# Patient Record
Sex: MALE | Race: WHITE | NOT HISPANIC OR LATINO | Employment: FULL TIME | URBAN - METROPOLITAN AREA
[De-identification: names, ages, dates, MRNs, and addresses within clinical notes are randomized per-mention and may not be internally consistent; named-entity substitution may affect disease eponyms.]

---

## 2022-01-26 ENCOUNTER — OFFICE VISIT (OUTPATIENT)
Dept: DERMATOLOGY | Facility: CLINIC | Age: 22
End: 2022-01-26
Payer: COMMERCIAL

## 2022-01-26 DIAGNOSIS — L70.0 ACNE VULGARIS: Primary | ICD-10-CM

## 2022-01-26 PROCEDURE — 99204 OFFICE O/P NEW MOD 45 MIN: CPT | Performed by: DERMATOLOGY

## 2022-01-26 RX ORDER — SULFAMETHOXAZOLE/TRIMETHOPRIM 800-160 MG
TABLET ORAL
Qty: 180 TABLET | Refills: 0 | Status: SHIPPED | OUTPATIENT
Start: 2022-01-26 | End: 2022-06-06

## 2022-01-26 RX ORDER — TAZAROTENE 1 MG/G
CREAM TOPICAL
Qty: 60 G | Refills: 11 | Status: SHIPPED | OUTPATIENT
Start: 2022-01-26 | End: 2022-06-06

## 2022-01-26 RX ORDER — SULFAMETHOXAZOLE/TRIMETHOPRIM 800-160 MG
TABLET ORAL
COMMUNITY
Start: 2021-12-29 | End: 2022-01-26

## 2022-01-26 RX ORDER — CLASCOTERONE 1 G/100G
CREAM TOPICAL
COMMUNITY
Start: 2021-12-01

## 2022-01-26 RX ORDER — TAZAROTENE 1 MG/G
CREAM TOPICAL
COMMUNITY
End: 2022-01-26

## 2022-01-26 NOTE — Clinical Note
Send photos and phone in 8 weeks. Thursday photos and phone in the afternoon, anytime in the afternoon. You can just send an appt in [employer], no call needed

## 2022-01-26 NOTE — PATIENT INSTRUCTIONS
[facility] Dermatology Visit    Thank you for allowing us to participate in your care. Your findings, instructions and follow-up plan are as follows:         When should I call my doctor?    If you are worsening or not improving, please, contact us or seek urgent care as noted below.     Who should I call with questions (adults)?    [facility] (adult and pediatric): [phone]    [facility] (adult): [phone]    For urgent needs outside of business hours call the [facility] at [phone] and ask for the dermatology resident on call    If this is a medical emergency and you are unable to reach an ER, Call 911    Who should I call with questions (pediatric)?  [facility]- Pediatric Dermatology  Dr. Magaly Metz, Dr. Emilie Vinson, Dr. Ivelisse Martinez, Zeinab Cardneas, [place]  Dr. Sera Morales, Dr. Merry Horowitz & Dr. Neftaly Whatley  Non Urgent  Nurse Triage Line; [phone]- Martine and Mahogany GUADARRAMA Care Coordinators   Joi (/Complex ) [phone]    If you need a prescription refill, please contact your pharmacy. Refills are approved or denied by our physicians during normal business hours, Monday through Fridays  Per office policy, refills will not be granted if you have not been seen within the past year (or sooner depending on your child's condition).    Scheduling Information:  Pediatric Appointment Scheduling and Call Center [phone]  Radiology Scheduling- [phone]  Sedation Unit Scheduling- [phone]  [place] Scheduling- General [phone]; Pediatric Dermatology [phone]  Main  Services: [phone]  Greek: [phone]  Bahraini: [phone]  Hmong/English/Slovenian: [phone]  Preadmission Nursing Department Fax Number: [phone] (fax all pre-operative paperwork to this number)    For urgent matters arising during evenings,  weekends, or holidays that cannot wait for normal business hours please call [phone] and ask for the dermatology resident on call to be paged.                       Clascoterone: Patient drug information  Access [employer] for additional drug information, tools, and databases.  Copyright 3177-9560 [employer] All rights reserved.       Stop and hold this drug if irritating to skin.        Brand Names: US  Winlevi     What is this drug used for?    It is used to treat pimples (acne).      What do I need to tell my doctor BEFORE I take this drug?    If you are allergic to this drug; any part of this drug; or any other drugs, foods, or substances. Tell your doctor about the allergy and what signs you had.  This drug may interact with other drugs or health problems.  Tell your doctor and pharmacist about all of your drugs (prescription or OTC, natural products, vitamins) and health problems. You must check to make sure that it is safe for you to take this drug with all of your drugs and health problems. Do not start, stop, or change the dose of any drug without checking with your doctor.      What are some things I need to know or do while I take this drug?    Tell all of your health care providers that you take this drug. This includes your doctors, nurses, pharmacists, and dentists.    Use care when using on a large part of the skin. Talk with the doctor.    Use of other skin products while using this drug may cause more irritation.    Talk with your doctor before you use other drugs or products on your skin.    If the patient is a child, use this drug with care. The risk of some side effects may be higher in children.    Tell your doctor if you are pregnant, plan on getting pregnant, or are breast-feeding. You will need to talk about the benefits and risks to you and the baby.      What are some side effects that I need to call my doctor about right away?  WARNING/CAUTION: Even though it may be rare,  some people may have very bad and sometimes deadly side effects when taking a drug. Tell your doctor or get medical help right away if you have any of the following signs or symptoms that may be related to a very bad side effect:    Signs of an allergic reaction, like rash; hives; itching; red, swollen, blistered, or peeling skin with or without fever; wheezing; tightness in the chest or throat; trouble breathing, swallowing, or talking; unusual hoarseness; or swelling of the mouth, face, lips, tongue, or throat.    Signs of a weak adrenal gland like a very bad upset stomach or throwing up, very bad dizziness or passing out, muscle weakness, feeling very tired, mood changes, not hungry, or weight loss.      What are some other side effects of this drug?  All drugs may cause side effects. However, many people have no side effects or only have minor side effects. Call your doctor or get medical help if any of these side effects or any other side effects bother you or do not go away:    Dryness, itching, redness, scaling, or other irritation where this drug was used.  These are not all of the side effects that may occur. If you have questions about side effects, call your doctor. Call your doctor for medical advice about side effects.  You may report side effects to your national health agency.      How is this drug best taken?  Use this drug as ordered by your doctor. Read all information given to you. Follow all instructions closely.    Do not take this drug by mouth. Use on your skin only. Keep out of your mouth, nose, and eyes (may burn).    Do not put in the vagina.    If you get this drug in any of these areas, rinse well with water.    Clean affected part before use. Make sure to dry well.    Put a thin layer on the affected part.    Wash your hands after use.    Do not put on cuts, scrapes, eczema, or damaged skin.    Do not put on sunburned skin.    Do not use coverings (bandages, dressings) unless told to do  so by the doctor.      What do I do if I miss a dose?    Put on a missed dose as soon as you think about it.    If it is close to the time for your next dose, skip the missed dose and go back to your normal time.    Do not put on 2 doses at the same time or extra doses.  How do I store and/or throw out this drug?    Store unopened containers in a refrigerator. Do not freeze.    After opening, store at room temperature.    Throw away any part not used 180 days after you get it from the pharmacy or 1 month after first use, whichever comes first.    Keep all drugs in a safe place. Keep all drugs out of the reach of children and pets.    Throw away unused or  drugs. Do not flush down a toilet or pour down a drain unless you are told to do so. Check with your pharmacist if you have questions about the best way to throw out drugs. There may be drug take-back programs in your area.    General drug facts    If your symptoms or health problems do not get better or if they become worse, call your doctor.    Do not share your drugs with others and do not take anyone else's drugs.    Some drugs may have another patient information leaflet. If you have any questions about this drug, please talk with your doctor, nurse, pharmacist, or other health care provider.    If you think there has been an overdose, call your poison control center or get medical care right away. Be ready to tell or show what was taken, how much, and when it happened.  Last Reviewed Date  2020

## 2022-01-26 NOTE — LETTER
Date:January 27, 2022      Patient was self referred, no letter generated. Do not send.        [facility] Health Information

## 2022-01-26 NOTE — PROGRESS NOTES
[facility] Dermatology Note  Encounter Date: Jan 26, 2022  Office Visit     Dermatology Problem List:  # Acne vulgaris  - Current rx: Neutrogena wash, Winlevi 1% cream BID, Bactrim BID, and tazarotene 0.1% cream  - Previous Rx: Tazorac, Acitretin, azithromycin, Onexton, seysara (2019), Solodyn (2013), Epiduo (2011), Differin (2011), Augmentin (2014), soriatane (2016), Cipro, alpha hydroxy acid 40% and 50% chemical peel monthly (2017 - 2021)  - Previous lasers: Sciton (red), Erbium (scars), Profound (scars)  - ILK   - At home UV light  # Family history of cystic acne and Crohn's  # Social hx-   ____________________________________________    Assessment & Plan:    # Acne vulgaris- appears to be severe with severe scarring, does not want accutane due to the risk of inflammatory bowel disease. Wants peels but does not want out of pocket cost. Saw Dr. Garcia, I agree that co2 should not be performed unless acne is clear but we could consider PDL so I will review his record.  He appears improved on bactrim, we will try to drop the dose the next few weeks    - Discussed that chemical peels are salicylic acid based and not covered by insurance.    - Also discussed various laser treatments and advised that PDL could be used on PDL. Advised that this is not covered by insurance unless the scars are keloidal or hypertrophic.   - Patient does not feel that the side effects of Accutane outweigh the benefits.   - Continue Winlevi 1% cream BID. No more than 60 grams in 1 month. HPA suppression reviewed  - Continue Tazarotene 0.1% cream once daily. Refills provided. Not irritating.   - Continue Bactrim 1-2x daily. Recommended tapering down to once daily. Reviewed risk of idiosyncratic reaction and skin records  - Quote for PDL 2 areas. However, need records from Dr. Garcia to see opinion prior to performing.       # acne scarring as above    # Scars, forearm- possibly excoriations but patient  reports possibly acne    Procedures Performed:   None    Follow-up: 6-8 week(s) virtually (telephone with photos), or earlier for new or changing lesions    Staff and Scribe:     Scribe Disclosure:  I, Leny Malvin, am serving as a scribe to document services personally performed by Ivonne Corral MD based on data collection and the provider's statements to me.       Provider Disclosure:   The documentation recorded by the scribe accurately reflects the services I personally performed and the decisions made by me.    Ivonne Corral MD    Department of Dermatology  [facility]: Phone: [phone], Fax:[phone]  [facility]: Phone: [phone], Fax: [phone]      ____________________________________________    CC: Derm Problem (Dheeraj is here to establish care for acne treatment. Has had topical and laser treatment and does not want accutane.)    HPI:  Mr. Dheeraj Hernandez is a(n) 21 year old male who presents today as a new patient for acne.    He developed cystic acne at about 9 years old. Patient is from [place] where he followed with Dr. Dmitry Waller for many years. Since moving here in July 2021, he has seen Dr. Garica and Dr. Gamez. He states his acne is cyclic. It tends to worsen in the summer, particularly on his forehead. Stress also worsens his acne. Today, is a good day for his acne.     Many treatments work for a short period of time, then stop working. He was previously on soriatane for about 1.5 years, which caused severe dry lips and skin. He has also been on BP which he did not feel were beneficial. He would like to restart montly chemical peels  Currently, he is using Neutrogena wash, Winlevi 1% cream BID, Bactrim BID (since October by Dr. Gamez, no side effects), and tazarotene 0.1% cream. Bactrim is cheaper than other medications and works decently. No history of  depression or anxiety.     Dr. Waller noticed a lot of bad reactions on Accutane. His mother and aunt have Crohn's disease. He does not typically pick at his acne.       Patient is otherwise feeling well, without additional skin concerns.    Labs Reviewed:  N/A    Physical Exam:  Vitals: There were no vitals taken for this visit.  SKIN: Focused examination of face and trunk was performed.  - Acneiform scarring on the right forearm. Erythematous acneiform scaring on chest and arms. Erythematous, severe and boxscarring  - There are superifical acneiform papules with intermixed open and closed comedones on the forehead and left cheek.   - Nodules on left cheek  - No other lesions of concern on areas examined.     Medications:  Current Outpatient Medications   Medication     Clascoterone (WINLEVI) 1 % CREA     sulfamethoxazole-trimethoprim (BACTRIM DS) 800-160 MG tablet     tazarotene (TAZORAC) 0.1 % external cream     No current facility-administered medications for this visit.      Past Medical History:   There is no problem list on file for this patient.    No past medical history on file.     CC Referred Self, MD  No address on file on close of this encounter.

## 2022-01-26 NOTE — LETTER
1/26/2022       RE: Dheeraj Hernandez  [address]  [address]  [place] [place] [address]     Dear Colleague,    Thank you for referring your patient, Dheeraj Hernandez, to the [facility]MAT[facility] [place] [facility]. Please see a copy of my visit note below.    [facility] Dermatology Note  Encounter Date: Jan 26, 2022  Office Visit     Dermatology Problem List:  # Acne vulgaris  - Current rx: Neutrogena wash, Winlevi 1% cream BID, Bactrim BID, and tazarotene 0.1% cream  - Previous Rx: Tazorac, Acitretin, azithromycin, Onexton, seysara (2019), Solodyn (2013), Epiduo (2011), Differin (2011), Augmentin (2014), soriatane (2016), Cipro, alpha hydroxy acid 40% and 50% chemical peel monthly (2017 - 2021)  - Previous lasers: Sciton (red), Erbium (scars), Profound (scars)  - ILK   - At home UV light  # Family history of cystic acne and Crohn's  # Social hx-   ____________________________________________    Assessment & Plan:    # Acne vulgaris- appears to be severe with severe scarring, does not want accutane due to the risk of inflammatory bowel disease. Wants peels but does not want out of pocket cost. Saw Dr. Garcia, I agree that co2 should not be performed unless acne is clear but we could consider PDL so I will review his record.  He appears improved on bactrim, we will try to drop the dose the next few weeks    - Discussed that chemical peels are salicylic acid based and not covered by insurance.    - Also discussed various laser treatments and advised that PDL could be used on PDL. Advised that this is not covered by insurance unless the scars are keloidal or hypertrophic.   - Patient does not feel that the side effects of Accutane outweigh the benefits.   - Continue Winlevi 1% cream BID. No more than 60 grams in 1 month. HPA suppression reviewed  - Continue Tazarotene 0.1% cream once daily. Refills provided. Not  irritating.   - Continue Bactrim 1-2x daily. Recommended tapering down to once daily. Reviewed risk of idiosyncratic reaction and skin records  - Quote for PDL 2 areas. However, need records from Dr. Garcia to see opinion prior to performing.       # acne scarring as above    # Scars, forearm- possibly excoriations but patient reports possibly acne    Procedures Performed:   None    Follow-up: 6-8 week(s) virtually (telephone with photos), or earlier for new or changing lesions    Staff and Scribe:     Scribe Disclosure:  I, Leny Coombs, am serving as a scribe to document services personally performed by Ivonne Corral MD based on data collection and the provider's statements to me.       Provider Disclosure:   The documentation recorded by the scribe accurately reflects the services I personally performed and the decisions made by me.    Ivonne Corral MD    Department of Dermatology  [facility]: Phone: [phone], Fax:[phone]  [facility]: Phone: [phone], Fax: [phone]      ____________________________________________    CC: Derm Problem (Dheeraj is here to establish care for acne treatment. Has had topical and laser treatment and does not want accutane.)    HPI:  Mr. Dheeraj Hernandez is a(n) 21 year old male who presents today as a new patient for acne.    He developed cystic acne at about 9 years old. Patient is from [place] where he followed with Dr. Dmitry Waller for many years. Since moving here in July 2021, he has seen Dr. Garcia and Dr. Gamez. He states his acne is cyclic. It tends to worsen in the summer, particularly on his forehead. Stress also worsens his acne. Today, is a good day for his acne.     Many treatments work for a short period of time, then stop working. He was previously on soriatane for about 1.5 years, which caused severe dry lips and skin. He  has also been on BP which he did not feel were beneficial. He would like to restart montly chemical peels  Currently, he is using Neutrogena wash, Winlevi 1% cream BID, Bactrim BID (since October by Dr. Gamez, no side effects), and tazarotene 0.1% cream. Bactrim is cheaper than other medications and works decently. No history of depression or anxiety.     Dr. Waller noticed a lot of bad reactions on Accutane. His mother and aunt have Crohn's disease. He does not typically pick at his acne.       Patient is otherwise feeling well, without additional skin concerns.    Labs Reviewed:  N/A    Physical Exam:  Vitals: There were no vitals taken for this visit.  SKIN: Focused examination of face and trunk was performed.  - Acneiform scarring on the right forearm. Erythematous acneiform scaring on chest and arms. Erythematous, severe and boxscarring  - There are superifical acneiform papules with intermixed open and closed comedones on the forehead and left cheek.   - Nodules on left cheek  - No other lesions of concern on areas examined.     Medications:  Current Outpatient Medications   Medication     Clascoterone (WINLEVI) 1 % CREA     sulfamethoxazole-trimethoprim (BACTRIM DS) 800-160 MG tablet     tazarotene (TAZORAC) 0.1 % external cream     No current facility-administered medications for this visit.      Past Medical History:   There is no problem list on file for this patient.    No past medical history on file.     JENNY Ramires MD  No address on file on close of this encounter.        Again, thank you for allowing me to participate in the care of your patient.      Sincerely,    Ivonne Corral MD

## 2022-01-27 ENCOUNTER — TELEPHONE (OUTPATIENT)
Dept: DERMATOLOGY | Facility: CLINIC | Age: 22
End: 2022-01-27
Payer: COMMERCIAL

## 2022-01-27 NOTE — TELEPHONE ENCOUNTER
PA Initiation    Medication: tazarotene (TAZORAC) 0.1 % external cream   Insurance Company: [employer] - Phone [phone] Fax [phone]  Pharmacy Filling the Rx: [id number] - [place], [place] - [address] [place] [address]  Filling Pharmacy Phone: [phone]  Filling Pharmacy Fax: [phone]  Start Date: 1/27/2022

## 2022-01-27 NOTE — TELEPHONE ENCOUNTER
Prior Authorization Retail Medication Request    Medication/Dose: tazarotene (TAZORAC) 0.1 % external cream  ICD code (if different than what is on RX):  Acne vulgaris [L70.0]  Previously Tried and Failed:    Rationale:      Insurance Name:  [facility]  Insurance ID:  [id number]

## 2022-01-28 NOTE — TELEPHONE ENCOUNTER
Prior Authorization Approval    Authorization Effective Date: 1/27/2022  Authorization Expiration Date: 1/26/2025  Medication: tazarotene (TAZORAC) 0.1 % external cream--APPROVED  Approved Dose/Quantity:   Reference #:     Insurance Company: [employer] - Phone [phone] Fax [phone]  Expected CoPay:       CoPay Card Available:      Foundation Assistance Needed:    Which Pharmacy is filling the prescription (Not needed for infusion/clinic administered): [id number] - [address]  Pharmacy Notified: Yes  Patient Notified: Yes **Instructed pharmacy to notify patient when script is ready to /ship.**

## 2022-02-06 ENCOUNTER — HEALTH MAINTENANCE LETTER (OUTPATIENT)
Age: 22
End: 2022-02-06

## 2022-03-09 ENCOUNTER — TELEPHONE (OUTPATIENT)
Dept: DERMATOLOGY | Facility: CLINIC | Age: 22
End: 2022-03-09
Payer: COMMERCIAL

## 2022-03-09 NOTE — TELEPHONE ENCOUNTER
3/9 Provided phone number [phone] to schedule virtual appointment with Dr. Corral in [place].     [facility]   Orthopedics, Podiatry, Sports Medicine, ENT/Eye Specialties  [facility] [place]   [phone]

## 2022-03-22 NOTE — PROGRESS NOTES
[facility] Dermatology Note  Encounter Date: Mar 23, 2022  Store and forward  [phone]  Start 1:02pm  Stop 1:06pm    Dermatology Problem List:  # Acne vulgaris  - Current rx: Neutrogena wash, Winlevi 1% cream BID, Bactrim BID, and tazarotene 0.1% cream  - Previous Rx: Tazorac, Acitretin, azithromycin, Onexton, seysara (2019), Solodyn (2013), Epiduo (2011), Differin (2011), Augmentin (2014), soriatane (2016), Cipro, alpha hydroxy acid 40% and 50% chemical peel monthly (2017 - 2021)  - Previous lasers: Sciton (red), Erbium (scars), Profound (scars)  - ILK   - At home UV light  # Family history of cystic acne and Crohn's  # Social hx-   ____________________________________________     Assessment & Plan:     # Acne vulgaris with severe scarring. Declines accutane  Due to the risk of inflammatory bowel disease. On bactrim and was doing well. Do not do CO2 until clear.  Last visit bactrim dose dropped but did not work. Reviewed that long term antibiotics are not out favorite. WE will try again in 3-6 months to drop down bactrim. He was switched from amoxicillin to bactrim by Sabrina Stevenson  -again review need Dr. Joshi record.   - Continue Winlevi 1% cream BID. No change  - Continue tazorac 0.1% cream daily  - Continue Bactrim 1-2x daily , reviewed okay to try to drop dose again.  If you get rash stop bactrim.   - Quote for PDL 2 areas- need Zel skin records prior    -declines refills  # acne scarring as above     # Scars, forearm- possibly excoriations but patient reports possibly acne    Procedures Performed:   AN    Follow-up: in 1-3 months pulsed dye laser    Staff:     Ivonne Corral MD    Department of Dermatology  [facility]: Phone: [phone], Fax:[phone]  [facility]: Phone: [phone], Fax:  [phone]      ____________________________________________    CC: Derm Problem (Dheeraj is following up for acne)    HPI:  Mr. Dheeraj Hernandez is a(n) 22 year old male who presents today as a return patient for acne and acne scarring. On topicals. Is using bactim once daily and then flared and went back to 2. Using winlevi cream twice daily. Feels acne is the same.     Patient is otherwise feeling well, without additional skin concerns.     Labs Reviewed:  NA    Physical Exam:  Vitals: There were no vitals taken for this visit.  SKIN: face and back  - acneiform lesions cheeks and scar and lower back and chest photos taken march 22  Rolling and scarring, taken today    - No other lesions of concern on areas examined.     Medications:  Current Outpatient Medications   Medication     Clascoterone (WINLEVI) 1 % CREA     sulfamethoxazole-trimethoprim (BACTRIM DS) 800-160 MG tablet     tazarotene (TAZORAC) 0.1 % external cream     No current facility-administered medications for this visit.      Past Medical History:   There is no problem list on file for this patient.    No past medical history on file.    CC Ivonne Corral MD  [address] [address] on close of this encounter.

## 2022-03-22 NOTE — PATIENT INSTRUCTIONS
[facility] Dermatology Visit    Thank you for allowing us to participate in your care. Your findings, instructions and follow-up plan are as follows:     Quote for cheeks scar for PDL laser $300    When should I call my doctor?    If you are worsening or not improving, please, contact us or seek urgent care as noted below.     Who should I call with questions (adults)?    [facility] (adult and pediatric): [phone]    [facility] (adult): [phone]    For urgent needs outside of business hours call the [facility] at [phone] and ask for the dermatology resident on call    If this is a medical emergency and you are unable to reach an ER, Call [phone]    Who should I call with questions (pediatric)?  [facility]- Pediatric Dermatology  Dr. Magaly Metz, Dr. Emilie Vinson, Dr. Ivelisse Martinez, Zeinab Cardenas, PA  Dr. Sera Morales, Dr. Merry Horowitz & Dr. Neftaly Whatley  Non Urgent  Nurse Triage Line; [phone]- Martine and Mahogany GUADARRAMA Care Coordinators   Joi (/Complex ) [phone]    If you need a prescription refill, please contact your pharmacy. Refills are approved or denied by our physicians during normal business hours, Monday through Fridays  Per office policy, refills will not be granted if you have not been seen within the past year (or sooner depending on your child's condition).    Scheduling Information:  Pediatric Appointment Scheduling and Call Center [phone]  Radiology Scheduling- [phone]  Sedation Unit Scheduling- [phone]  [place] Scheduling- General [phone]; Pediatric Dermatology [phone]  Main  Services: [phone]  Taiwanese: [phone]  Egyptian: [phone]  Hmong/Kareem/Khmer: [phone]  Preadmission Nursing Department Fax Number: [phone] (fax all pre-operative paperwork to this number)    For  urgent matters arising during evenings, weekends, or holidays that cannot wait for normal business hours please call [phone] and ask for the dermatology resident on call to be paged.

## 2022-03-23 ENCOUNTER — VIRTUAL VISIT (OUTPATIENT)
Dept: DERMATOLOGY | Facility: CLINIC | Age: 22
End: 2022-03-23
Payer: COMMERCIAL

## 2022-03-23 DIAGNOSIS — L90.5 ACNE SCARRING: ICD-10-CM

## 2022-03-23 DIAGNOSIS — L70.0 ACNE VULGARIS: Primary | ICD-10-CM

## 2022-03-23 PROCEDURE — 99213 OFFICE O/P EST LOW 20 MIN: CPT | Mod: 95 | Performed by: DERMATOLOGY

## 2022-03-23 NOTE — LETTER
Date:March 24, 2022      Provider requested that no letter be sent. Do not send.       [facility]

## 2022-03-23 NOTE — NURSING NOTE
Dermatology Rooming Note    Dheeraj Hernandez's goals for this visit include:   Chief Complaint   Patient presents with     Derm Problem     Dheeraj is following up for acne

## 2022-03-23 NOTE — LETTER
3/23/2022       RE: Dheeraj Hernandez  [address]  [address]  [place] [address]     Dear Colleague,    Thank you for referring your patient, Dheeraj Hernandez, to the [facility] DERMATOLOGY CLINIC [place] [facility]. Please see a copy of my visit note below.         [facility] Dermatology Note  Encounter Date: Mar 23, 2022  Store and forward  [phone]  Start 1:02pm  Stop 1:06pm    Dermatology Problem List:  # Acne vulgaris  - Current rx: Neutrogena wash, Winlevi 1% cream BID, Bactrim BID, and tazarotene 0.1% cream  - Previous Rx: Tazorac, Acitretin, azithromycin, Onexton, seysara (2019), Solodyn (2013), Epiduo (2011), Differin (2011), Augmentin (2014), soriatane (2016), Cipro, alpha hydroxy acid 40% and 50% chemical peel monthly (2017 - 2021)  - Previous lasers: Sciton (red), Erbium (scars), Profound (scars)  - ILK   - At home UV light  # Family history of cystic acne and Crohn's  # Social hx-   ____________________________________________     Assessment & Plan:     # Acne vulgaris with severe scarring. Declines accutane  Due to the risk of inflammatory bowel disease. On bactrim and was doing well. Do not do CO2 until clear.  Last visit bactrim dose dropped but did not work. Reviewed that long term antibiotics are not out favorite. WE will try again in 3-6 months to drop down bactrim. He was switched from amoxicillin to bactrim by Sabrina Stevenson  -again review need Dr. Joshi record.   - Continue Winlevi 1% cream BID. No change  - Continue tazorac 0.1% cream daily  - Continue Bactrim 1-2x daily , reviewed okay to try to drop dose again.  If you get rash stop bactrim.   - Quote for PDL 2 areas- need Zel skin records prior    -declines refills  # acne scarring as above     # Scars, forearm- possibly excoriations but patient reports possibly acne    Procedures Performed:   AN    Follow-up: in 1-3 months pulsed dye  laser    Staff:     Ivonne Corral MD    Department of Dermatology  [facility]: Phone: [phone], Fax:[phone]  [facility]: Phone: [phone], Fax: [phone]      ____________________________________________    CC: Derm Problem (Dheeraj is following up for acne)    HPI:  Mr. Dheeraj Hernandez is a(n) 22 year old male who presents today as a return patient for acne and acne scarring. On topicals. Is using bactim once daily and then flared and went back to 2. Using winlevi cream twice daily. Feels acne is the same.     Patient is otherwise feeling well, without additional skin concerns.     Labs Reviewed:  NA    Physical Exam:  Vitals: There were no vitals taken for this visit.  SKIN: face and back  - acneiform lesions cheeks and scar and lower back and chest photos taken march 22  Rolling and scarring, taken today    - No other lesions of concern on areas examined.     Medications:  Current Outpatient Medications   Medication     Clascoterone (WINLEVI) 1 % CREA     sulfamethoxazole-trimethoprim (BACTRIM DS) 800-160 MG tablet     tazarotene (TAZORAC) 0.1 % external cream     No current facility-administered medications for this visit.      Past Medical History:   There is no problem list on file for this patient.    No past medical history on file.    CC Ivonne Corral MD  [address] [place] [address] on close of this encounter.          Again, thank you for allowing me to participate in the care of your patient.      Sincerely,    Ivonne Corral MD

## 2022-06-06 ENCOUNTER — VIRTUAL VISIT (OUTPATIENT)
Dept: DERMATOLOGY | Facility: CLINIC | Age: 22
End: 2022-06-06
Payer: COMMERCIAL

## 2022-06-06 DIAGNOSIS — L70.0 ACNE VULGARIS: ICD-10-CM

## 2022-06-06 PROCEDURE — 99214 OFFICE O/P EST MOD 30 MIN: CPT | Mod: 95 | Performed by: DERMATOLOGY

## 2022-06-06 RX ORDER — TAZAROTENE 1 MG/G
CREAM TOPICAL
Qty: 60 G | Refills: 11 | Status: SHIPPED | OUTPATIENT
Start: 2022-06-06

## 2022-06-06 RX ORDER — DOXYCYCLINE 100 MG/1
100 CAPSULE ORAL 2 TIMES DAILY
Qty: 180 CAPSULE | Refills: 0 | Status: SHIPPED | OUTPATIENT
Start: 2022-06-06 | End: 2022-08-03

## 2022-06-06 NOTE — PROGRESS NOTES
[facility] Dermatology Note  Encounter Date: Jun 6, 2022  Store-and-Forward and Telephone ([phone] ). Location of teledermatologist: [facility].  Start time:5:22pm End time: 5:36pm.    Dermatology Problem List:  # Acne vulgaris  - Current rx: Neutrogena wash, Winlevi 1% cream BID, Bactrim BID, and tazarotene 0.1% cream  - Previous Rx: Acitretin, azithromycin, Onexton, seysara (2019), Solodyn (2013), Epiduo (2011), Differin (2011), Augmentin (2014), soriatane (2016), Cipro, alpha hydroxy acid 40% and 50% chemical peel monthly (2017 - 2021), khloe records was on amoxicillin  - Previous lasers: Sciton (red), Erbium (scars), Profound (scars)  - ILK   - At home UV light  # Family history of cystic acne and Crohn's  # Social hx-   ____________________________________________     Assessment & Plan:     # Acne vulgaris with severe scarring. He Declines accutane due to concerns for the risk of inflammatory bowel disease. Offered GI assessment (mom with Crohns).  On bactrim and was doing well, but no longer working. Peels and PDL too expensive. Go back to doxycycline and change to Stryke club wash  -again review need Dr. Joshi record.   - Continue Winlevi 1% cream BID. No change  - Continue tazorac 0.1% cream daily, refilled  -Stryke club wash  from tazorac  - Start doxycycline 100 mg BID. Recommend that patient try tocalcium-containing products around the time of taking the medication.  Instructed to take with a full glass of fluid and food.  Side effects of photosensitivity, headaches, GI upset discussed. Recommend sun protection.    - Quote for PDL 2 areas in past- can remove hair, has seen zel, recommend record review prior, reviewed this can permenantly remove hair   # acne scarring as above     # Scars, forearm- recheck at follow up when in person    Procedures Performed:    None    Follow-up: 8 week(s) virtually (telephone with photos), or  earlier for new or changing lesions    Staff:     Ivonne Corral MD    Department of Dermatology  [facility]: Phone: [phone], Fax:[phone]  [facility]: Phone: [phone], Fax: [phone]      ____________________________________________    CC: Derm Problem (Dheeraj is here for an acne appt. )    HPI:  . Dheeraj Hernandez is a(n) 22 year old male who presents today as a return patient for acne. He feels he is flared up this weekend. He is still on the bactrim twice daily, winlevi and tazorac.    The patient reports he is worse. Want to try another treatment.       Patient is otherwise feeling well, without additional skin concerns.    Labs Reviewed:   N/A    Physical Exam:  Vitals: There were no vitals taken for this visit.  SKIN: Teledermatology photos were reviewed; image quality and interpretability: acceptable. Image date: taken last night  - severe acneiform scaring and papules on face, trunk and extremeities  - No other lesions of concern on areas examined.     Medications:  Current Outpatient Medications   Medication     Clascoterone (WINLEVI) 1 % CREA     sulfamethoxazole-trimethoprim (BACTRIM DS) 800-160 MG tablet     tazarotene (TAZORAC) 0.1 % external cream     No current facility-administered medications for this visit.      Past Medical/Surgical History:   There is no problem list on file for this patient.    No past medical history on file.    CC Referred Self, MD  No address on file on close of this encounter.

## 2022-06-06 NOTE — NURSING NOTE
Chief Complaint   Patient presents with     Derm Problem     Dheeraj is here for an acne appt.    Teledermatology Nurse Call Patients:     Are you in the [facility] at the time of the encounter? yes    Today's visit will be billed to you and your insurance.    A teledermatology visit is not as thorough as an in-person visit and the quality of the photograph sent may not be of the same quality as that taken by the dermatology clinic.  Claude George VF

## 2022-06-06 NOTE — PATIENT INSTRUCTIONS
[facility] Dermatology Visit    Thank you for allowing us to participate in your care. Your findings, instructions and follow-up plan are as follows:    Try face and body wash          When should I call my doctor?  If you are worsening or not improving, please, contact us or seek urgent care as noted below.     Who should I call with questions (adults)?  [facility] (adult and pediatric): [phone]  [facility] (adult): [phone]  For urgent needs outside of business hours call the [facility] at [phone] and ask for the dermatology resident on call  If this is a medical emergency and you are unable to reach an ER, Call 911    Who should I call with questions (pediatric)?  [facility]- Pediatric Dermatology  Dr. Magaly Metz, Dr. Emilie Vinson, Dr. Ivelisse Martinez, Zeinab Cardenas, PA  Dr. Sera Morales, Dr. Merry Horowitz & Dr. Neftaly Whatley  Non Urgent  Nurse Triage Line; [phone]- Martine and Mahogany GUADARRAMA Care Coordinators   Joi (/Complex ) [phone]    If you need a prescription refill, please contact your pharmacy. Refills are approved or denied by our physicians during normal business hours, Monday through Fridays  Per office policy, refills will not be granted if you have not been seen within the past year (or sooner depending on your child's condition).    Scheduling Information:  Pediatric Appointment Scheduling and Call Center [phone]  Radiology Scheduling- [phone]  Sedation Unit Scheduling- [phone]  [place] Scheduling- General [phone]; Pediatric Dermatology [phone]  Main  Services: [phone]  Amharic: [phone]  Cambodian: [phone]  Hmong/Angolan/Nicaraguan: [phone]  Preadmission Nursing Department Fax Number: [phone] (fax all pre-operative paperwork to this number)    For urgent matters arising  during evenings, weekends, or holidays that cannot wait for normal business hours please call [phone] and ask for the dermatology resident on call to be paged.

## 2022-06-06 NOTE — LETTER
6/6/2022         RE: Dheeraj Hernandez  [address]  [address]  [place] [address]        Dear Colleague,    Thank you for referring your patient, Dheeraj Hernandez, to the [facility]. Please see a copy of my visit note below.    [facility] Dermatology Note  Encounter Date: Jun 6, 2022  Store-and-Forward and Telephone ([phone] ). Location of teledermatologist: [facility].  Start time:5:22pm End time: 5:36pm.    Dermatology Problem List:  # Acne vulgaris  - Current rx: Neutrogena wash, Winlevi 1% cream BID, Bactrim BID, and tazarotene 0.1% cream  - Previous Rx: Acitretin, azithromycin, Onexton, seysara (2019), Solodyn (2013), Epiduo (2011), Differin (2011), Augmentin (2014), soriatane (2016), Cipro, alpha hydroxy acid 40% and 50% chemical peel monthly (2017 - 2021), khloe records was on amoxicillin  - Previous lasers: Sciton (red), Erbium (scars), Profound (scars)  - ILK   - At home UV light  # Family history of cystic acne and Crohn's  # Social hx-   ____________________________________________     Assessment & Plan:     # Acne vulgaris with severe scarring. He Declines accutane due to concerns for the risk of inflammatory bowel disease. Offered GI assessment (mom with Crohns).  On bactrim and was doing well, but no longer working. Peels and PDL too expensive. Go back to doxycycline and change to Stryke club wash  -again review need Dr. Joshi record.   - Continue Winlevi 1% cream BID. No change  - Continue tazorac 0.1% cream daily, refilled  -Stryke club wash  from tazorac  - Start doxycycline 100 mg BID. Recommend that patient try tocalcium-containing products around the time of taking the medication.  Instructed to take with a full glass of fluid and food.  Side effects of photosensitivity, headaches, GI upset discussed. Recommend sun protection.    - Quote for PDL 2 areas in past- can remove hair, has  seen zel, recommend record review prior, reviewed this can permenantly remove hair   # acne scarring as above     # Scars, forearm- recheck at follow up when in person    Procedures Performed:    None    Follow-up: 8 week(s) virtually (telephone with photos), or earlier for new or changing lesions    Staff:     Ivonne Corral MD    Department of Dermatology  [facility]: Phone: [phone], Fax:[phone]  [facility]: Phone: [phone], Fax: [phone]      ____________________________________________    CC: Derm Problem (Dheeraj is here for an acne appt. )    HPI:  Mr. Dheeraj Hernandez is a(n) 22 year old male who presents today as a return patient for acne. He feels he is flared up this weekend. He is still on the bactrim twice daily, winlevi and tazorac.    The patient reports he is worse. Want to try another treatment.       Patient is otherwise feeling well, without additional skin concerns.    Labs Reviewed:   N/A    Physical Exam:  Vitals: There were no vitals taken for this visit.  SKIN: Teledermatology photos were reviewed; image quality and interpretability: acceptable. Image date: taken last night  - severe acneiform scaring and papules on face, trunk and extremeities  - No other lesions of concern on areas examined.     Medications:  Current Outpatient Medications   Medication     Clascoterone (WINLEVI) 1 % CREA     sulfamethoxazole-trimethoprim (BACTRIM DS) 800-160 MG tablet     tazarotene (TAZORAC) 0.1 % external cream     No current facility-administered medications for this visit.      Past Medical/Surgical History:   There is no problem list on file for this patient.    No past medical history on file.    CC Referred Self, MD  No address on file on close of this encounter.      Again, thank you for allowing me to participate in the care of your patient.         Sincerely,        Ivonne Corral MD

## 2022-06-07 ENCOUNTER — TELEPHONE (OUTPATIENT)
Dept: DERMATOLOGY | Facility: CLINIC | Age: 22
End: 2022-06-07
Payer: COMMERCIAL

## 2022-06-07 NOTE — TELEPHONE ENCOUNTER
Pt will send Dr. Corral a message as he was sure they discussed an in person appointment in 8 weeks, not virtual. He will call back to schedule.    Daisy leonardo Procedure   Dermatology, General and Plastic Surgery, Urology Specialties   [facility] [place] [phone]

## 2022-07-27 NOTE — PROGRESS NOTES
[facility] Dermatology Note  Encounter Date: Aug 3, 2022  Office Visit     Dermatology Problem List:  1. Acne vulgaris  - Current rx: Neutrogena wash, Winlevi 1% cream BID, Bactrim BID, and tazarotene 0.1% cream, metronidazole cream, PDL test area  - Previous Rx: Acitretin, azithromycin, Onexton, seysara (2019), Solodyn (2013), Epiduo (2011), Differin (2011), Augmentin (2014), soriatane (2016), Cipro, alpha hydroxy acid 40% and 50% chemical peel monthly (2017 - 2021), khloe records was on amoxicillin  - Previous lasers: Sciton (red), Erbium (scars), Profound (scars)  - ILK   - At home UV light  2. Family history of cystic acne and Crohn's  3. Social hx-   ____________________________________________    Assessment & Plan:  # Nodulocystic acne with severe scarring. He Declines accutane due to concerns for the risk of inflammatory bowel disease. Offered GI assessment (mom with Crohns). Giving doxycyline 4 more weeks. Adding metronidazole. Very severe acne. Decliens prednisone.   - again review need Dr. Joshi record.   - Continue Winlevi 1% cream BID. No change  - Continue tazorac 0.1% cream daily, refilled  - Continue Stryke club wash  from tazorac  - Continue doxycycline 100 mg BID.  - Plan for chemical peels in [place] on face, upper back, and mid chest. Patient consented today.  For skin peels use 7% skin medica sal acid: Discussed risk of peels including but not limited to erythema, peeling, redness, blisters, reactivation of HSV, rare risk of scarring, and hypo and hyperpigmentation. Peel handout provided. Patient will likely require 3-6 peels, approximately 4-6 weeks apart for improvement. Patient will be using SkinMedica 7% peel. Patient will discontinue differin/retinoinds/BP/efudex containing products 1 week prior and 1 week after. Powell skin type II. Denies allergies to strawberries/aspirin/sulfa.  Has not been in Accutane in the last 6 months.  Not  currently pregnant or breastfeeding. Counseled to avoid hair removal procedures including lasers, depilatory cream, waxing and electrolysis the week prior.  Consent obtained in clinic today and pre-peel instructions provided in written format.  HOLD TAZORAC 1 week prior to chemical peel  - Start metronidazole 0.75% cream daily  - No charge test area of PDL today, see procedure note.    Procedures Performed:   None    Follow-up: 4-8 week(s) in-person, or earlier for new or changing lesions. DO not do chemical peels within 3 weeks of pulsed dye laser    See nursing in between for chemical peel in 3 weeks.    Staff and Scribe:     Scribe Disclosure:  I, Edson Moore, am serving as a scribe to document services personally performed by Ivonne Corral MD based on data collection and the provider's statements to me.     Provider Disclosure:   The documentation recorded by the scribe accurately reflects the services I personally performed and the decisions made by me.    Ivonne Corral MD    Department of Dermatology  [facility]: Phone: [phone], Fax:[phone]  [facility]: Phone: [phone], Fax: [phone]      ____________________________________________    CC: Derm Problem (Here for acne follow up, thinks it is staying the same. Concerned about chest. )    HPI:  Mr. Dheeraj Hernandez is a(n) 22 year old male who presents today as a return patient for acne follow-up. Last seen by myself on 6/6/22 at which point he was started on doxycycline 100 mg BID, tazorac, and Winlevi 1% cream for treatment of acne.     Today, patient reports that his acne seems overall unchanged and is still very active. He was seen by Dr. Joshi, and patient reports that he did not want to treat with lasers until acne was better controlled.    Patient is otherwise feeling well, without additional skin  concerns.    Labs Reviewed:  N/A    Physical Exam:  Vitals: There were no vitals taken for this visit.  SKIN: Focused examination of face, chest, abdomen, back was performed.  - Erythematous papules, nodules, and scarring on the face, chest, abdomen, and upper back.  - No other lesions of concern on areas examined.     Medications:  Current Outpatient Medications   Medication     Clascoterone (WINLEVI) 1 % CREA     doxycycline hyclate (VIBRAMYCIN) 100 MG capsule     tazarotene (TAZORAC) 0.1 % external cream     No current facility-administered medications for this visit.      Past Medical History:   There is no problem list on file for this patient.    History reviewed. No pertinent past medical history.     CC No referring provider defined for this encounter. on close of this encounter.

## 2022-08-03 ENCOUNTER — OFFICE VISIT (OUTPATIENT)
Dept: DERMATOLOGY | Facility: CLINIC | Age: 22
End: 2022-08-03
Payer: COMMERCIAL

## 2022-08-03 DIAGNOSIS — L70.0 NODULOCYSTIC ACNE: Primary | ICD-10-CM

## 2022-08-03 PROCEDURE — 99213 OFFICE O/P EST LOW 20 MIN: CPT | Performed by: DERMATOLOGY

## 2022-08-03 RX ORDER — DOXYCYCLINE 100 MG/1
100 CAPSULE ORAL 2 TIMES DAILY
Qty: 180 CAPSULE | Refills: 0 | Status: SHIPPED | OUTPATIENT
Start: 2022-08-03

## 2022-08-03 ASSESSMENT — PAIN SCALES - GENERAL: PAINLEVEL: NO PAIN (0)

## 2022-08-03 NOTE — LETTER
8/3/2022       RE: Dheeraj Hernandez  [address]  [address]  [facility] [address]     Dear Colleague,    Thank you for referring your patient, Dheeraj Hernandez, to the [facility] DERMATOLOGY [facility] [place] at [facility]. Please see a copy of my visit note below.    [facility] Dermatology Note  Encounter Date: Aug 3, 2022  Office Visit     Dermatology Problem List:  1. Acne vulgaris  - Current rx: Neutrogena wash, Winlevi 1% cream BID, Bactrim BID, and tazarotene 0.1% cream, metronidazole cream, PDL test area  - Previous Rx: Acitretin, azithromycin, Onexton, seysara (2019), Solodyn (2013), Epiduo (2011), Differin (2011), Augmentin (2014), soriatane (2016), Cipro, alpha hydroxy acid 40% and 50% chemical peel monthly (2017 - 2021), [facility] records was on amoxicillin  - Previous lasers: Sciton (red), Erbium (scars), Profound (scars)  - ILK   - At home UV light  2. Family history of cystic acne and Crohn's  3. Social hx-   ____________________________________________    Assessment & Plan:  # Nodulocystic acne with severe scarring. He Declines accutane due to concerns for the risk of inflammatory bowel disease. Offered GI assessment (mom with Crohns). Giving doxycyline 4 more weeks. Adding metronidazole. Very severe acne. Decliens prednisone.   - again review need Dr. Joshi record.   - Continue Winlevi 1% cream BID. No change  - Continue tazorac 0.1% cream daily, refilled  - Continue Stryke club wash  from tazorac  - Continue doxycycline 100 mg BID.  - Plan for chemical peels in [place] on face, upper back, and mid chest. Patient consented today.  For skin peels use 7% skin medica sal acid: Discussed risk of peels including but not limited to erythema, peeling, redness, blisters, reactivation of HSV, rare risk of scarring, and hypo and hyperpigmentation. Peel handout provided. Patient will likely require 3-6 peels,  approximately 4-6 weeks apart for improvement. Patient will be using SkinMedica 7% peel. Patient will discontinue differin/retinoinds/BP/efudex containing products 1 week prior and 1 week after. Powell skin type II. Denies allergies to strawberries/aspirin/sulfa.  Has not been in Accutane in the last 6 months.  Not currently pregnant or breastfeeding. Counseled to avoid hair removal procedures including lasers, depilatory cream, waxing and electrolysis the week prior.  Consent obtained in clinic today and pre-peel instructions provided in written format.  HOLD TAZORAC 1 week prior to chemical peel  - Start metronidazole 0.75% cream daily  - No charge test area of PDL today, see procedure note.    Procedures Performed:   None    Follow-up: 4-8 week(s) in-person, or earlier for new or changing lesions. DO not do chemical peels within 3 weeks of pulsed dye laser    See nursing in between for chemical peel in 3 weeks.    Staff and Scribe:     Scribe Disclosure:  I, Edson Moore, am serving as a scribe to document services personally performed by Ivonne Corral MD based on data collection and the provider's statements to me.     Provider Disclosure:   The documentation recorded by the scribe accurately reflects the services I personally performed and the decisions made by me.    Ivonne Corral MD    Department of Dermatology  [facility]: Phone: [phone], Fax:[phone]  [facility]: Phone: [phone], Fax: [phone]      ____________________________________________    CC: Derm Problem (Here for acne follow up, thinks it is staying the same. Concerned about chest. )    HPI:  Mr. Dheeraj Hernandez is a(n) 22 year old male who presents today as a return patient for acne follow-up. Last seen by myself on 6/6/22 at which point he was started on doxycycline 100 mg BID, tazorac, and  Winlevi 1% cream for treatment of acne.     Today, patient reports that his acne seems overall unchanged and is still very active. He was seen by Dr. Joshi, and patient reports that he did not want to treat with lasers until acne was better controlled.    Patient is otherwise feeling well, without additional skin concerns.    Labs Reviewed:  N/A    Physical Exam:  Vitals: There were no vitals taken for this visit.  SKIN: Focused examination of face, chest, abdomen, back was performed.  - Erythematous papules, nodules, and scarring on the face, chest, abdomen, and upper back.  - No other lesions of concern on areas examined.     Medications:  Current Outpatient Medications   Medication     Clascoterone (WINLEVI) 1 % CREA     doxycycline hyclate (VIBRAMYCIN) 100 MG capsule     tazarotene (TAZORAC) 0.1 % external cream     No current facility-administered medications for this visit.      Past Medical History:   There is no problem list on file for this patient.    History reviewed. No pertinent past medical history.     CC No referring provider defined for this encounter. on close of this encounter.        Again, thank you for allowing me to participate in the care of your patient.      Sincerely,    Ivonne Corral MD

## 2022-08-03 NOTE — PATIENT INSTRUCTIONS
DO not do chemical peels within 3 weeks of pulsed dye laser      Hold tazorac 7 days before chemical peels        Skin Peel Post Care Instructions    I will experience redness, swelling, peeling, pain, and heat sensation which can last 5-10days and may persist for 2-3weeks. I may experience itching, or acne. Risks are permanent scarring, temporary or permanent skin lightening or darkening, infection, and eye injury. I understand my outcome could be no improvement or slight improvement. Multiple treatments may be required.     What can I expect?  Skin peeling for three to five days  Flaking  Skin darkening  Redness    How do I take care of my skin?  Patient compliance is mandatory for an optimal outcome and to avoid complications  Wear sunscreen (SPF 30 or greater) and a hat. Stay out of the sun for three to four weeks   Use a gentle skin care regimen with a sunscreen with at least an SPF of 30 or more. Examples include the following:   SkinCeuticals Gentle Cleanser and SkinCeuticals Physical Matte UV DEFENSE SPF 50   Cetaphil Soap followed by Cetaphil Sunscreen in the am and pm  Put on a bland moisturizer (Aquaphor or Vaseline) if sunscreen stings  Do not pick at peel or peel the skin. This will cause scarring.  Wait to use medicated creams until the skin has healed. This occurs five to seven days later  You can use make-up after 24 hours. Make sure make-up does NOT contain salicylic acid.   Eye make-up and lipstick are okay immediately after procedure  Do not use facial scrubs, depilatories, steam, any exfoliation procedures, etc. on your face (or treated area of skin) for one week post-peel    When should I call the doctor?  Cold sores  Swelling  Crusting    Who should I call with questions?  [facility]: [phone]   [facility]: [phone]  For urgent needs outside of business hours call the [facility] at [phone] and ask for the  dermatology resident on call     Pulsed Dye Laser (PDL)    I will experience redness, swelling, pain, and heat sensation. I may experience bruising, itching, or acne. Risks are blistering, oozing, permanent scarring, hair loss, temporary or permanent skin lightening or darkening, infection, and eye injury. I understand my outcome could be no improvement, slight improvement. Multiple treatments may be required.    After treatment, Do Not:  Rub, scratch, or put weight on the site for 2 weeks  Wear tight fitting clothing or jewelry over the site  Reich. Keep the site out of sunlight. Use sunscreen of 30 SPF or greater when in the sun. Use sunscreen 30 minutes before going out and reapply if sweating. Tanning decreases the success of the treatment    How do I care for the treated site?  Use ice packs for 10-20 minutes after the procedure for swelling   If the site is on your face, use ice again 1 hour after treatment for ten minutes and repeat again before bed. Do not burn the skin with the ice.   If a scab or crust forms, gently cleanse the site with water. Then put on Vaseline  ointment 3 times a day and contact the clinic   If a blister forms, contact the clinic by phone  If you have concerns about swelling, call the clinic  Avoid sun exposure and do not get tan as this can darken the treated area, use sunscreen  Do not use makeup on any open wound  Do not come to your next treatment with a tan    What should I expect?  Mild swelling  Blue-purple color that may take 2 to 3 weeks to go away  Redness may also last a week or longer  Results may take up to 3 or 4 months after treatment  More procedures may be needed    Who should I call with questions?  [facility]: [phone]  [facility]: [phone]  For urgent needs outside of business hours call the [facility] at [phone] and ask for the dermatology resident on call  [employer] response  may be delayed, please call for urgent issues

## 2022-08-03 NOTE — PROCEDURES
Laser- VBeam(Pulsed Dye Laser) Procedure Note: Cosmetic       Dermatology Problem List:  1. Acne vulgaris  - Current rx: Neutrogena wash, Winlevi 1% cream BID, Bactrim BID, and tazarotene 0.1% cream, metronidazole cream  - Previous Rx: Acitretin, azithromycin, Onexton, seysara (2019), Solodyn (2013), Epiduo (2011), Differin (2011), Augmentin (2014), soriatane (2016), Cipro, alpha hydroxy acid 40% and 50% chemical peel monthly ( - ), [facility] records was on amoxicillin  - Previous lasers: Sciton (red), Erbium (scars), Profound (scars)  - ILK   - At home UV light  2. Family history of cystic acne and Crohn's  3. Social hx-     Procedure Date: Aug 3, 2022    Attending Staff Surgeon: Dr. Corral    Resident Surgeon: none    Assistant: Rema Carroll RN    Operating Room Data:     Surgery/Procedure Date:    SAME     Pre-operative Diagnosis:   Nodulocystic acne  Location: right chest test area - 3 passes  Right posterior shoulder  Number of areas: 2 - no charge     Operation/Procedure    Vbeam pulsed dye laser treatment#: 1     Post-operative Diagnosis:  SAME    Laser Settings:  Energy: 6 J/cm2  Spot size:12 mm  Pulse width:  10 mS (0.45 thru 40 mS)  Dynamic cooling spray settin mS  Dynamic cooling device delay:  20 mS    Fotofinder photos: No    Anesthesia:  None    Description of Operation/Procedure:   The nature and purpose of the procedure, associated risks, possible consequences, complications and alternative methods of treatment were explained in detail, this includes but is not limited to hyperpigmentation, hypopigmentation, scarring, bruising, hair loss pain/discomfort, eye injury, hair loss,  and blister. We reviewed that the outcome could be any of the following: no improvement, slight improvement or change in skin color & texture, the skin might be permanently lighter or darker, and though uncommon, superficial scarring may occur.  Multiple treatments may be recommended. The patient was  counseled was counseled that this laser is being used off label.   A photo and operative consent were obtained. Time-out was performed.The patient was positioned to optimally expose the area treated. Protective eyewear was worn by the patient and goggles on all personnel in the treatment room. The patient confirmed the site to be treated. The laser energy output was verified by meter reading.      The clinically evident lesion(s) was/were treated with Angie Vbeam pulsed dye laser (595 nm) beam as above. A total of 93 pulses were used. The patient tolerated the procedure well and no complications were noted. Post operative instructions were provided. The total laser operation and preparation time was <10 minutes.  The patient was counseled to call immediately for any issues and read the after visit summary for emergency contact information. The patient was counseled that [employer] messaging response may be delayed by several days, therefore, phone is preferred for emergency issues.     The patient will follow-up in 4-8 weeks.    The patient will not pay the cosmetic fee today. No charge test area. Do peel in 3 weeks, then laser in 7-8 weeks. Consider Aviclear.     Staff Involved:  Scribe/Staff    Scribe Disclosure:  I, Edson Moore, am serving as a scribe to document services personally performed by Ivonne Corral MD based on data collection and the provider's statements to me.     Provider Disclosure:   The documentation recorded by the scribe accurately reflects the services I personally performed and the decisions made by me.    Ivonne Corral MD    Department of Dermatology  [facility]: Phone: [phone], Fax:[phone]  [facility]: Phone: [phone], Fax: [phone]

## 2022-08-03 NOTE — NURSING NOTE
Chief Complaint   Patient presents with     Derm Problem     Here for acne follow up, thinks it is staying the same. Concerned about chest.      Jessy CASSIDY, EMT  Dermatology/Dermatology Surgery  [phone]

## 2022-08-03 NOTE — LETTER
Date:August 3, 2022      Provider requested that no letter be sent. Do not send.       [facility]

## 2022-09-09 ENCOUNTER — LAB REQUISITION (OUTPATIENT)
Dept: LAB | Facility: CLINIC | Age: 22
End: 2022-09-09
Payer: COMMERCIAL

## 2022-09-09 DIAGNOSIS — L70.0 ACNE VULGARIS: ICD-10-CM

## 2022-09-09 LAB
ALBUMIN SERPL-MCNC: 4.3 G/DL (ref 3.4–5)
ALP SERPL-CCNC: 55 U/L (ref 40–150)
ALT SERPL W P-5'-P-CCNC: 21 U/L (ref 0–70)
AST SERPL W P-5'-P-CCNC: 11 U/L (ref 0–45)
BASOPHILS # BLD AUTO: 0 10E3/UL (ref 0–0.2)
BASOPHILS NFR BLD AUTO: 0 %
BILIRUB DIRECT SERPL-MCNC: 0.1 MG/DL (ref 0–0.2)
BILIRUB SERPL-MCNC: 0.5 MG/DL (ref 0.2–1.3)
EOSINOPHIL # BLD AUTO: 0 10E3/UL (ref 0–0.7)
EOSINOPHIL NFR BLD AUTO: 1 %
ERYTHROCYTE [DISTWIDTH] IN BLOOD BY AUTOMATED COUNT: 13.5 % (ref 10–15)
HCT VFR BLD AUTO: 43.2 % (ref 40–53)
HGB BLD-MCNC: 13.9 G/DL (ref 13.3–17.7)
IMM GRANULOCYTES # BLD: 0.1 10E3/UL
IMM GRANULOCYTES NFR BLD: 1 %
LYMPHOCYTES # BLD AUTO: 1.2 10E3/UL (ref 0.8–5.3)
LYMPHOCYTES NFR BLD AUTO: 15 %
MCH RBC QN AUTO: 30.8 PG (ref 26.5–33)
MCHC RBC AUTO-ENTMCNC: 32.2 G/DL (ref 31.5–36.5)
MCV RBC AUTO: 96 FL (ref 78–100)
MONOCYTES # BLD AUTO: 0.8 10E3/UL (ref 0–1.3)
MONOCYTES NFR BLD AUTO: 10 %
NEUTROPHILS # BLD AUTO: 5.6 10E3/UL (ref 1.6–8.3)
NEUTROPHILS NFR BLD AUTO: 73 %
NRBC # BLD AUTO: 0 10E3/UL
NRBC BLD AUTO-RTO: 0 /100
PLATELET # BLD AUTO: 237 10E3/UL (ref 150–450)
PROT SERPL-MCNC: 7.8 G/DL (ref 6.8–8.8)
RBC # BLD AUTO: 4.51 10E6/UL (ref 4.4–5.9)
RETICS # AUTO: 0.07 10E6/UL (ref 0.03–0.1)
RETICS/RBC NFR AUTO: 1.7 % (ref 0.5–2)
WBC # BLD AUTO: 7.7 10E3/UL (ref 4–11)

## 2022-09-09 PROCEDURE — 85045 AUTOMATED RETICULOCYTE COUNT: CPT | Mod: ORL | Performed by: DERMATOLOGY

## 2022-09-09 PROCEDURE — 82955 ASSAY OF G6PD ENZYME: CPT | Mod: ORL | Performed by: DERMATOLOGY

## 2022-09-09 PROCEDURE — 80076 HEPATIC FUNCTION PANEL: CPT | Mod: ORL | Performed by: DERMATOLOGY

## 2022-09-09 PROCEDURE — 85025 COMPLETE CBC W/AUTO DIFF WBC: CPT | Mod: ORL | Performed by: DERMATOLOGY

## 2022-09-12 LAB — G6PD RBC-CCNT: 13.6 U/G HB

## 2022-10-03 ENCOUNTER — HEALTH MAINTENANCE LETTER (OUTPATIENT)
Age: 22
End: 2022-10-03

## 2022-10-31 ENCOUNTER — LAB REQUISITION (OUTPATIENT)
Dept: LAB | Facility: CLINIC | Age: 22
End: 2022-10-31
Payer: COMMERCIAL

## 2022-10-31 DIAGNOSIS — R21 RASH AND OTHER NONSPECIFIC SKIN ERUPTION: ICD-10-CM

## 2022-10-31 LAB
ALBUMIN SERPL-MCNC: 4.3 G/DL (ref 3.4–5)
ALP SERPL-CCNC: 68 U/L (ref 40–150)
ALT SERPL W P-5'-P-CCNC: 25 U/L (ref 0–70)
AST SERPL W P-5'-P-CCNC: 17 U/L (ref 0–45)
BASOPHILS # BLD AUTO: 0 10E3/UL (ref 0–0.2)
BASOPHILS NFR BLD AUTO: 0 %
BILIRUB DIRECT SERPL-MCNC: 0.1 MG/DL (ref 0–0.2)
BILIRUB SERPL-MCNC: 0.6 MG/DL (ref 0.2–1.3)
EOSINOPHIL # BLD AUTO: 0.1 10E3/UL (ref 0–0.7)
EOSINOPHIL NFR BLD AUTO: 1 %
ERYTHROCYTE [DISTWIDTH] IN BLOOD BY AUTOMATED COUNT: 12.9 % (ref 10–15)
HCT VFR BLD AUTO: 44.7 % (ref 40–53)
HGB BLD-MCNC: 14.6 G/DL (ref 13.3–17.7)
IMM GRANULOCYTES # BLD: 0 10E3/UL
IMM GRANULOCYTES NFR BLD: 1 %
LYMPHOCYTES # BLD AUTO: 1.3 10E3/UL (ref 0.8–5.3)
LYMPHOCYTES NFR BLD AUTO: 18 %
MCH RBC QN AUTO: 30.4 PG (ref 26.5–33)
MCHC RBC AUTO-ENTMCNC: 32.7 G/DL (ref 31.5–36.5)
MCV RBC AUTO: 93 FL (ref 78–100)
MONOCYTES # BLD AUTO: 0.8 10E3/UL (ref 0–1.3)
MONOCYTES NFR BLD AUTO: 11 %
NEUTROPHILS # BLD AUTO: 5 10E3/UL (ref 1.6–8.3)
NEUTROPHILS NFR BLD AUTO: 69 %
NRBC # BLD AUTO: 0 10E3/UL
NRBC BLD AUTO-RTO: 0 /100
PLATELET # BLD AUTO: 276 10E3/UL (ref 150–450)
PROT SERPL-MCNC: 8.2 G/DL (ref 6.8–8.8)
RBC # BLD AUTO: 4.8 10E6/UL (ref 4.4–5.9)
RETICS # AUTO: 0.07 10E6/UL (ref 0.03–0.1)
RETICS/RBC NFR AUTO: 1.5 % (ref 0.5–2)
WBC # BLD AUTO: 7.3 10E3/UL (ref 4–11)

## 2022-10-31 PROCEDURE — 82627 DEHYDROEPIANDROSTERONE: CPT | Mod: ORL | Performed by: DERMATOLOGY

## 2022-10-31 PROCEDURE — 84270 ASSAY OF SEX HORMONE GLOBUL: CPT | Mod: ORL | Performed by: DERMATOLOGY

## 2022-10-31 PROCEDURE — 84403 ASSAY OF TOTAL TESTOSTERONE: CPT | Mod: ORL | Performed by: DERMATOLOGY

## 2022-10-31 PROCEDURE — 85025 COMPLETE CBC W/AUTO DIFF WBC: CPT | Mod: ORL | Performed by: DERMATOLOGY

## 2022-10-31 PROCEDURE — 85045 AUTOMATED RETICULOCYTE COUNT: CPT | Mod: ORL | Performed by: DERMATOLOGY

## 2022-10-31 PROCEDURE — 80076 HEPATIC FUNCTION PANEL: CPT | Mod: ORL | Performed by: DERMATOLOGY

## 2022-11-01 LAB
DHEA-S SERPL-MCNC: 845 UG/DL (ref 80–560)
SHBG SERPL-SCNC: 17 NMOL/L (ref 11–80)

## 2022-11-05 LAB
TESTOST FREE SERPL-MCNC: 8.66 NG/DL
TESTOST SERPL-MCNC: 316 NG/DL (ref 240–950)

## 2023-01-28 ENCOUNTER — OFFICE VISIT (OUTPATIENT)
Dept: OPHTHALMOLOGY | Facility: CLINIC | Age: 23
End: 2023-01-28
Payer: COMMERCIAL

## 2023-01-28 DIAGNOSIS — Z80.8 FAMILY HISTORY OF RETINOBLASTOMA: ICD-10-CM

## 2023-01-28 DIAGNOSIS — Z86.69 HISTORY OF AMBLYOPIA: Primary | ICD-10-CM

## 2023-01-28 PROCEDURE — 92004 COMPRE OPH EXAM NEW PT 1/>: CPT | Performed by: STUDENT IN AN ORGANIZED HEALTH CARE EDUCATION/TRAINING PROGRAM

## 2023-01-28 PROCEDURE — 92015 DETERMINE REFRACTIVE STATE: CPT | Performed by: STUDENT IN AN ORGANIZED HEALTH CARE EDUCATION/TRAINING PROGRAM

## 2023-01-28 RX ORDER — DAPSONE 25 MG/1
TABLET ORAL
COMMUNITY
Start: 2023-01-01

## 2023-01-28 ASSESSMENT — EXTERNAL EXAM - LEFT EYE: OS_EXAM: NORMAL

## 2023-01-28 ASSESSMENT — CONF VISUAL FIELD
OS_SUPERIOR_NASAL_RESTRICTION: 0
METHOD: COUNTING FINGERS
OD_NORMAL: 1
OD_SUPERIOR_NASAL_RESTRICTION: 0
OS_INFERIOR_TEMPORAL_RESTRICTION: 0
OS_INFERIOR_NASAL_RESTRICTION: 0
OS_SUPERIOR_TEMPORAL_RESTRICTION: 0
OD_INFERIOR_NASAL_RESTRICTION: 0
OD_SUPERIOR_TEMPORAL_RESTRICTION: 0
OS_NORMAL: 1
OD_INFERIOR_TEMPORAL_RESTRICTION: 0

## 2023-01-28 ASSESSMENT — VISUAL ACUITY
OS_CC: 20/25
CORRECTION_TYPE: GLASSES
METHOD: SNELLEN - LINEAR
OD_CC: 20/20

## 2023-01-28 ASSESSMENT — TONOMETRY
IOP_METHOD: ICARE
OS_IOP_MMHG: 21
OD_IOP_MMHG: 17

## 2023-01-28 ASSESSMENT — REFRACTION_WEARINGRX
OS_AXIS: 090
SPECS_TYPE: SVL
OD_AXIS: 090
OS_CYLINDER: +1.00
OD_CYLINDER: +1.00
OS_SPHERE: +3.75
OD_SPHERE: +3.75

## 2023-01-28 ASSESSMENT — REFRACTION_MANIFEST
OS_CYLINDER: +1.00
OD_CYLINDER: +0.50
OD_AXIS: 085
OS_SPHERE: +3.50
OS_AXIS: 085
OD_SPHERE: +4.25

## 2023-01-28 ASSESSMENT — EXTERNAL EXAM - RIGHT EYE: OD_EXAM: NORMAL

## 2023-01-28 ASSESSMENT — CUP TO DISC RATIO
OS_RATIO: 0.1
OD_RATIO: 0.1

## 2023-01-28 ASSESSMENT — SLIT LAMP EXAM - LIDS
COMMENTS: NORMAL
COMMENTS: NORMAL

## 2023-01-28 NOTE — NURSING NOTE
Chief Complaints and History of Present Illnesses   Patient presents with     COMPREHENSIVE EYE EXAM     Chief Complaint(s) and History of Present Illness(es)     COMPREHENSIVE EYE EXAM            Course: stable    Associated symptoms: Negative for eye pain, flashes and floaters    Treatments tried: no treatments          Comments    Dheeraj Hernandez is a 22 year old male who presents for a comprehensive exam. Last eye exam was over 1.5 years ago. Since exam, vision is about the same. Needs a new Mrx for safety glasses for work. Denies using lubricating drops. No flashes or floaters. No eye pain.    Hx of amblyopia left eye - s/p strabismus 2006     Chip Mccracken COT 8:25 AM January 28, 2023

## 2023-02-11 ENCOUNTER — HEALTH MAINTENANCE LETTER (OUTPATIENT)
Age: 23
End: 2023-02-11

## 2023-03-13 ENCOUNTER — TELEPHONE (OUTPATIENT)
Dept: OPHTHALMOLOGY | Facility: CLINIC | Age: 23
End: 2023-03-13
Payer: COMMERCIAL

## 2023-03-13 NOTE — TELEPHONE ENCOUNTER
Health Call Center    Phone Message    May a detailed message be left on voicemail: yes     Reason for Call: Other: Patient called requesting a call back from clinical staff. He is wanting to discuss his glasses. He states he can see better with his old prescription. Please call to advise.      Action Taken: Other: eye    Travel Screening: Not Applicable

## 2024-03-09 ENCOUNTER — HEALTH MAINTENANCE LETTER (OUTPATIENT)
Age: 24
End: 2024-03-09

## 2024-07-23 ENCOUNTER — LAB REQUISITION (OUTPATIENT)
Dept: LAB | Facility: CLINIC | Age: 24
End: 2024-07-23

## 2024-07-23 DIAGNOSIS — Z79.899 OTHER LONG TERM (CURRENT) DRUG THERAPY: ICD-10-CM

## 2024-07-23 LAB
ALBUMIN SERPL BCG-MCNC: 4.9 G/DL (ref 3.5–5.2)
ALP SERPL-CCNC: 71 U/L (ref 40–150)
ALT SERPL W P-5'-P-CCNC: 18 U/L (ref 0–70)
AST SERPL W P-5'-P-CCNC: 18 U/L (ref 0–45)
BASOPHILS # BLD AUTO: 0 10E3/UL (ref 0–0.2)
BASOPHILS NFR BLD AUTO: 1 %
BILIRUB DIRECT SERPL-MCNC: <0.2 MG/DL (ref 0–0.3)
BILIRUB SERPL-MCNC: 0.3 MG/DL
EOSINOPHIL # BLD AUTO: 0.1 10E3/UL (ref 0–0.7)
EOSINOPHIL NFR BLD AUTO: 1 %
ERYTHROCYTE [DISTWIDTH] IN BLOOD BY AUTOMATED COUNT: 13.1 % (ref 10–15)
HCT VFR BLD AUTO: 42.5 % (ref 40–53)
HGB BLD-MCNC: 14.2 G/DL (ref 13.3–17.7)
IMM GRANULOCYTES # BLD: 0.1 10E3/UL
IMM GRANULOCYTES NFR BLD: 1 %
LYMPHOCYTES # BLD AUTO: 1.6 10E3/UL (ref 0.8–5.3)
LYMPHOCYTES NFR BLD AUTO: 25 %
MCH RBC QN AUTO: 30.4 PG (ref 26.5–33)
MCHC RBC AUTO-ENTMCNC: 33.4 G/DL (ref 31.5–36.5)
MCV RBC AUTO: 91 FL (ref 78–100)
MONOCYTES # BLD AUTO: 0.7 10E3/UL (ref 0–1.3)
MONOCYTES NFR BLD AUTO: 11 %
NEUTROPHILS # BLD AUTO: 3.9 10E3/UL (ref 1.6–8.3)
NEUTROPHILS NFR BLD AUTO: 62 %
NRBC # BLD AUTO: 0 10E3/UL
NRBC BLD AUTO-RTO: 0 /100
PLATELET # BLD AUTO: 221 10E3/UL (ref 150–450)
PROT SERPL-MCNC: 7.8 G/DL (ref 6.4–8.3)
RBC # BLD AUTO: 4.67 10E6/UL (ref 4.4–5.9)
RETICS # AUTO: 0.08 10E6/UL (ref 0.03–0.1)
RETICS/RBC NFR AUTO: 1.7 % (ref 0.5–2)
WBC # BLD AUTO: 6.3 10E3/UL (ref 4–11)

## 2024-07-23 PROCEDURE — 85045 AUTOMATED RETICULOCYTE COUNT: CPT | Mod: ORL | Performed by: DERMATOLOGY

## 2024-07-23 PROCEDURE — 80076 HEPATIC FUNCTION PANEL: CPT | Mod: ORL | Performed by: DERMATOLOGY

## 2024-07-23 PROCEDURE — 85025 COMPLETE CBC W/AUTO DIFF WBC: CPT | Mod: ORL | Performed by: DERMATOLOGY

## 2025-03-16 ENCOUNTER — HEALTH MAINTENANCE LETTER (OUTPATIENT)
Age: 25
End: 2025-03-16